# Patient Record
Sex: MALE | Race: WHITE | HISPANIC OR LATINO | URBAN - METROPOLITAN AREA
[De-identification: names, ages, dates, MRNs, and addresses within clinical notes are randomized per-mention and may not be internally consistent; named-entity substitution may affect disease eponyms.]

---

## 2018-07-30 ENCOUNTER — OFFICE VISIT (OUTPATIENT)
Dept: ORTHOPEDIC SURGERY | Facility: CLINIC | Age: 48
End: 2018-07-30

## 2018-07-30 VITALS
HEART RATE: 77 BPM | DIASTOLIC BLOOD PRESSURE: 91 MMHG | HEIGHT: 67 IN | BODY MASS INDEX: 31.39 KG/M2 | WEIGHT: 200 LBS | SYSTOLIC BLOOD PRESSURE: 142 MMHG

## 2018-07-30 DIAGNOSIS — M70.22 OLECRANON BURSITIS OF LEFT ELBOW: ICD-10-CM

## 2018-07-30 DIAGNOSIS — M77.12 LATERAL EPICONDYLITIS OF LEFT ELBOW: Primary | ICD-10-CM

## 2018-07-30 PROCEDURE — 99203 OFFICE O/P NEW LOW 30 MIN: CPT | Performed by: ORTHOPAEDIC SURGERY

## 2018-07-30 PROCEDURE — 20605 DRAIN/INJ JOINT/BURSA W/O US: CPT | Performed by: ORTHOPAEDIC SURGERY

## 2018-07-30 RX ORDER — METHYLPREDNISOLONE 4 MG/1
TABLET ORAL
Qty: 21 TABLET | Refills: 0 | Status: SHIPPED | OUTPATIENT
Start: 2018-07-30 | End: 2018-08-30

## 2018-07-30 RX ORDER — BETAMETHASONE SODIUM PHOSPHATE AND BETAMETHASONE ACETATE 3; 3 MG/ML; MG/ML
6 INJECTION, SUSPENSION INTRA-ARTICULAR; INTRALESIONAL; INTRAMUSCULAR; SOFT TISSUE
Status: COMPLETED | OUTPATIENT
Start: 2018-07-30 | End: 2018-07-30

## 2018-07-30 RX ADMIN — BETAMETHASONE SODIUM PHOSPHATE AND BETAMETHASONE ACETATE 6 MG: 3; 3 INJECTION, SUSPENSION INTRA-ARTICULAR; INTRALESIONAL; INTRAMUSCULAR; SOFT TISSUE at 13:50

## 2018-07-30 NOTE — PROGRESS NOTES
Subjective: Left elbow pain     Patient ID: Armando Bellamy is a 47 y.o. male.    Chief Complaint:    History of Present Illness 47-year-old male right-hand-dominant is seen for his left elbow is symptomatic for about 2-3 weeks.  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  Was seen in urgent care x-rayed and now presents here for evaluation.  Was placed on meloxicam and given a tennis elbow band but still having some discomfort.       Social History     Occupational History   • Not on file.     Social History Main Topics   • Smoking status: Never Smoker   • Smokeless tobacco: Never Used   • Alcohol use Yes      Comment: occassionally   • Drug use: No   • Sexual activity: Defer      Review of Systems   Constitutional: Negative for chills, diaphoresis, fever and unexpected weight change.   HENT: Negative for hearing loss, nosebleeds, sore throat and tinnitus.    Eyes: Negative for pain and visual disturbance.   Respiratory: Negative for cough, shortness of breath and wheezing.    Cardiovascular: Negative for chest pain and palpitations.   Gastrointestinal: Negative for abdominal pain, diarrhea, nausea and vomiting.   Endocrine: Negative for cold intolerance, heat intolerance and polydipsia.   Genitourinary: Negative for difficulty urinating, dysuria and hematuria.   Musculoskeletal: Positive for arthralgias, joint swelling and myalgias.   Skin: Negative for rash and wound.   Allergic/Immunologic: Negative for environmental allergies.   Neurological: Negative for dizziness, syncope and numbness.   Hematological: Does not bruise/bleed easily.   Psychiatric/Behavioral: Negative for dysphoric mood and sleep disturbance. The patient is not nervous/anxious.          Past Medical History:   Diagnosis Date   • Hypertension      History reviewed. No pertinent surgical history.  Family History   Problem Relation  Age of Onset   • No Known Problems Mother    • No Known Problems Father          Objective:  Vitals:    07/30/18 1340   BP: 142/91   Pulse: 77     1    07/30/18  1340   Weight: 90.7 kg (200 lb)     Body mass index is 31.32 kg/m².       Ortho Exam  reviewed the x-ray sent with the patient AP and lateral completely within normal limits showing no acute or chronic changes.  He is alert and oriented ×3.  Head is normocephalic and sclerae clear.  Left upper extremity shows no motor deficit as far as radial, ulnar median nerve function of his no sensory loss.  His good capillary refill.  He has full range of motion of the elbows for flexion extension pronation supination is marked pain to palpation over the lateral upper condyle and with dorsiflexion against resistance and supination of the elbow.  Also has a small olecranon bursa but there is no erythema noted.  Skin is cool to touch.  He is tolerating meloxicam without any GI side effects.    Assessment:       1. Lateral epicondylitis of left elbow    2. Olecranon bursitis of left elbow          Plan: Over 20 minutes was spent reviewing the patient's x-ray films brought with him his physical finding outlined a treatment plan going forward I do recommend he continue the meloxicam and the tennis elbow band but also going aspirate the olecranon bursa.  After sterile prep and underwent aspiration of 4 cc of the slightly tendon serous fluid.  1 cc Celestone was then put into the bursa and an Ace wrap was applied.  Patient instructed again he continued with a tennis elbow band but him also will start him on a steroid pack to finish and then resume the meloxicam.  Off work until Wednesday return to see me in [place] office on August 16    Medium Joint Arthrocentesis  Date/Time: 7/30/2018 1:50 PM  Consent given by: patient  Site marked: site marked  Timeout: Immediately prior to procedure a time out was called to verify the correct patient, procedure, equipment, support  staff and site/side marked as required   Supporting Documentation  Indications: pain   Procedure Details  Location: elbow - L olecranon bursa  Preparation: Patient was prepped and draped in the usual sterile fashion  Needle size: 18 G  Medications administered: 6 mg betamethasone acetate-betamethasone sodium phosphate 6 (3-3) MG/ML  Aspirate amount: 4 mL  Aspirate: blood-tinged and bloody  Patient tolerance: patient tolerated the procedure well with no immediate complications                  Work Status:    CHASITY query complete.    Orders:  Orders Placed This Encounter   Procedures   • Medium Joint Arthrocentesis       Medications:  New Medications Ordered This Visit   Medications   • MethylPREDNISolone (MEDROL, VLADISLAV,) 4 MG tablet     Sig: Use as directed by package instructions     Dispense:  21 tablet     Refill:  0       Followup:  Return in about 2 weeks (around 8/13/2018).          Dragon transcription disclaimer     Much of this encounter note is an electronic transcription/translation of spoken language to printed text. The electronic translation of spoken language may permit erroneous, or at times, nonsensical words or phrases to be inadvertently transcribed. Although I have reviewed the note for such errors, some may still exist.

## 2018-08-30 ENCOUNTER — OFFICE VISIT (OUTPATIENT)
Dept: ORTHOPEDIC SURGERY | Facility: CLINIC | Age: 48
End: 2018-08-30

## 2018-08-30 VITALS — BODY MASS INDEX: 31.08 KG/M2 | HEIGHT: 67 IN | WEIGHT: 198 LBS

## 2018-08-30 DIAGNOSIS — M70.22 OLECRANON BURSITIS OF LEFT ELBOW: ICD-10-CM

## 2018-08-30 DIAGNOSIS — M77.12 LATERAL EPICONDYLITIS OF LEFT ELBOW: Primary | ICD-10-CM

## 2018-08-30 PROCEDURE — 99212 OFFICE O/P EST SF 10 MIN: CPT | Performed by: ORTHOPAEDIC SURGERY

## 2018-08-30 NOTE — PROGRESS NOTES
Subjective: Left elbow pain     Patient ID: Armando Bellamy is a 47 y.o. male.    Chief Complaint:    History of Present Illness patient seen in follow-up regarding the left olecranon bursitis lateral epicondylitis of the elbow.  He is relatively asymptomatic at this time.  The swelling to the elbow or versus now recurred he has minimal tenderness over the lateral epicondyle.  He has gone back to work with no problems       Social History     Occupational History   • Not on file.     Social History Main Topics   • Smoking status: Never Smoker   • Smokeless tobacco: Never Used   • Alcohol use Yes      Comment: occassionally   • Drug use: No   • Sexual activity: Defer      Review of Systems   Constitutional: Negative for chills, diaphoresis, fever and unexpected weight change.   HENT: Negative for hearing loss, nosebleeds, sore throat and tinnitus.    Eyes: Negative for pain and visual disturbance.   Respiratory: Negative for cough, shortness of breath and wheezing.    Cardiovascular: Negative for chest pain and palpitations.   Gastrointestinal: Negative for abdominal pain, diarrhea, nausea and vomiting.   Endocrine: Negative for cold intolerance, heat intolerance and polydipsia.   Genitourinary: Negative for difficulty urinating, dysuria and hematuria.   Musculoskeletal: Positive for myalgias. Negative for arthralgias and joint swelling.   Skin: Negative for rash and wound.   Allergic/Immunologic: Negative for environmental allergies.   Neurological: Negative for dizziness, syncope and numbness.   Hematological: Does not bruise/bleed easily.   Psychiatric/Behavioral: Negative for dysphoric mood and sleep disturbance. The patient is not nervous/anxious.          Past Medical History:   Diagnosis Date   • Hypertension      History reviewed. No pertinent surgical history.  Family History   Problem Relation Age of Onset   • No Known Problems Mother    • No Known Problems Father          Objective:  There were  no vitals filed for this visit.  1    08/30/18  0900   Weight: 89.8 kg (198 lb)     Body mass index is 31.01 kg/m².       Ortho Exam  is alert and oriented ×3.  There is no swelling effusion left olecranon bursa.  He has full range of motion of the elbow.  Flexion extension pronation supination.  No motor sensory deficits.  Good distal pulses.  Minimal tenderness over the lateral epicondyle.    Assessment:       1. Lateral epicondylitis of left elbow    2. Olecranon bursitis of left elbow          Plan: Was instructed to continue meloxicam until completely pain-free.  Return to see me if symptoms persist after 4            Work Status:    CHASITY query complete.    Orders:  No orders of the defined types were placed in this encounter.      Medications:  No orders of the defined types were placed in this encounter.      Followup:  Return if symptoms worsen or fail to improve.          Dictated utilizing Dragon dictation